# Patient Record
Sex: MALE | Race: WHITE | Employment: UNEMPLOYED | URBAN - METROPOLITAN AREA
[De-identification: names, ages, dates, MRNs, and addresses within clinical notes are randomized per-mention and may not be internally consistent; named-entity substitution may affect disease eponyms.]

---

## 2018-10-22 ENCOUNTER — OFFICE VISIT (OUTPATIENT)
Dept: FAMILY MEDICINE | Facility: CLINIC | Age: 48
End: 2018-10-22

## 2018-10-22 ENCOUNTER — TELEPHONE (OUTPATIENT)
Dept: ADDICTION MEDICINE | Facility: CLINIC | Age: 48
End: 2018-10-22

## 2018-10-22 VITALS
HEART RATE: 75 BPM | SYSTOLIC BLOOD PRESSURE: 110 MMHG | WEIGHT: 177 LBS | TEMPERATURE: 97.5 F | OXYGEN SATURATION: 98 % | DIASTOLIC BLOOD PRESSURE: 70 MMHG

## 2018-10-22 DIAGNOSIS — F19.20 ADDICTION TO DRUG (H): ICD-10-CM

## 2018-10-22 DIAGNOSIS — F10.29 ALCOHOL DEPENDENCE WITH UNSPECIFIED ALCOHOL-INDUCED DISORDER (H): Primary | ICD-10-CM

## 2018-10-22 PROCEDURE — 99213 OFFICE O/P EST LOW 20 MIN: CPT | Performed by: FAMILY MEDICINE

## 2018-10-22 RX ORDER — BUPRENORPHINE HYDROCHLORIDE AND NALOXONE HYDROCHLORIDE DIHYDRATE 8; 2 MG/1; MG/1
1 TABLET SUBLINGUAL 2 TIMES DAILY
COMMUNITY

## 2018-10-22 RX ORDER — ESCITALOPRAM OXALATE 20 MG/1
20 TABLET ORAL DAILY
COMMUNITY

## 2018-10-22 RX ORDER — METHYLPHENIDATE HYDROCHLORIDE 36 MG/1
36 TABLET ORAL EVERY MORNING
COMMUNITY

## 2018-10-22 NOTE — MR AVS SNAPSHOT
After Visit Summary   10/22/2018    Chong Ribeiro    MRN: [id number]           Patient Information     Date Of Birth          1970        Visit Information        Provider Department      10/22/2018 12:20 PM Ilya Irene MD [facility]        Today's Diagnoses     Alcohol dependence with unspecified alcohol-induced disorder (H)    -  1    Addiction to drug (H)           Follow-ups after your visit        Additional Services     ADDICTION MEDICINE REFERRAL       The Addiction Medicine Service is prepared to provide consultation for and, if necessary, ongoing care for patients with the disease of addiction, ages 16 and up.     For acute detox, please send your patient to the ER or contact [facility] at [phone].     Common problems that may warrant referral to the Addiction Medicine Service are:  Alcohol use disorder - diagnosis, treatment referral, acute and protracted withdrawal management, and ongoing medication assisted treatment including Antabuse and Naltrexone.  Opioid Use Disorder - medication assisted treatment including Buprenorphine (Suboxone) or extended release Naltrexone (Vivitrol)  Benzodiazepine dependence - extended outpatient detoxification  Many other issues pertaining to addiction, relapse, and recovery    Please contact our scheduling staff at [phone] with any questions.    Referrals to the Addiction Medicine Service assume that the referring provider has discussed the referral with the patient.  Referral will be reviewed and if appropriate, the patient will be contacted to schedule appointment.  If not appropriate, the provider will be contacted with further information.    Please answer the following questions so we can better service your patient:    Drug of choice: currently not on drugs, on suboxone for opiates  Do they have a [facility] PCP:  No     Please bring the following to your appointment:  >>   List of current  "medications   >>   Any relevant history                  Who to contact     If you have questions or need follow up information about today's clinic visit or your schedule please contact [facility] directly at [phone].  Normal or non-critical lab and imaging results will be communicated to you by MyChart, letter or phone within 4 business days after the clinic has received the results. If you do not hear from us within 7 days, please contact the clinic through MyChart or phone. If you have a critical or abnormal lab result, we will notify you by phone as soon as possible.  Submit refill requests through [employer] or call your pharmacy and they will forward the refill request to us. Please allow 3 business days for your refill to be completed.          Additional Information About Your Visit        MyChart Information     [employer] lets you send messages to your doctor, view your test results, renew your prescriptions, schedule appointments and more. To sign up, go to [email or web address] . Click on \"Log in\" on the left side of the screen, which will take you to the Welcome page. Then click on \"Sign up Now\" on the right side of the page.     You will be asked to enter the access code listed below, as well as some personal information. Please follow the directions to create your username and password.     Your access code is: [email or web address]  Expires: 2019 12:25 PM     Your access code will  in 90 days. If you need help or a new code, please call your [place] clinic or [phone].        Care EveryWhere ID     This is your Care EveryWhere ID. This could be used by other organizations to access your [facility] medical records  [id number]        Your Vitals Were     Pulse Temperature Pulse Oximetry             75 97.5  F (36.4  C) (Tympanic) 98%          Blood Pressure from Last 3 Encounters:   10/22/18 110/70    Weight from Last 3 Encounters:   10/22/18 177 lb (80.3 kg)              We " Performed the Following     ADDICTION MEDICINE REFERRAL        Primary Care Provider    None Specified       No primary provider on file.        Equal Access to Services     MAREMICHAEL GEENA : Daiana Hurley, colt acosta, shanna bermeo, jackie abrams . So [facility] [phone].    ATENCIÓN: Si habla español, tiene a chaidez disposición servicios gratuitos de asistencia lingüística. Llame al [phone].    We comply with applicable federal civil rights laws and [place] laws. We do not discriminate on the basis of race, color, national origin, age, disability, sex, sexual orientation, or gender identity.            Thank you!     Thank you for choosing [facility]  for your care. Our goal is always to provide you with excellent care. Hearing back from our patients is one way we can continue to improve our services. Please take a few minutes to complete the written survey that you may receive in the mail after your visit with us. Thank you!             Your Updated Medication List - Protect others around you: Learn how to safely use, store and throw away your medicines at www.disposemymeds.org.          This list is accurate as of 10/22/18 12:25 PM.  Always use your most recent med list.                   Brand Name Dispense Instructions for use Diagnosis    buprenorphine-naloxone 8-2 MG Subl sublingual tablet    SUBOXONE     Place 1 tablet under the tongue 2 times daily        CONCERTA 36 MG CR tablet   Generic drug:  methylphenidate ER      Take 36 mg by mouth every morning        LEXAPRO 20 MG tablet   Generic drug:  escitalopram      Take 20 mg by mouth daily

## 2018-10-22 NOTE — PROGRESS NOTES
SUBJECTIVE:   Chong Ribeiro is a 48 year old male who presents to clinic today for the following health issues:      Addiction referral.     Patient is a 48-year-old male who is originally from [place] moved to [place] 8 days ago.  Previously according to him he has addiction to multiple medications including opioids, heroin.  He is currently on Suboxone as per patient.  Takes Lexapro and Concerta.  Patient also have addiction to alcohol which according to him he is trying to wean off.  He was drinking heavily up until last week and he is not drinking 1-2 drinks.  He denies any withdrawal.  He is wondering what he can do to get some more help regarding his addiction.        Problem list and histories reviewed & adjusted, as indicated.  Additional history: as documented    Patient Active Problem List   Diagnosis     Alcohol dependence with unspecified alcohol-induced disorder (H)     Addiction to drug (H)     No past surgical history on file.    Social History   Substance Use Topics     Smoking status: Current Every Day Smoker     Packs/day: 0.25     Smokeless tobacco: Never Used     Alcohol use Yes     No family history on file.      Current Outpatient Prescriptions   Medication Sig Dispense Refill     buprenorphine-naloxone (SUBOXONE) 8-2 MG SUBL sublingual tablet Place 1 tablet under the tongue 2 times daily       escitalopram (LEXAPRO) 20 MG tablet Take 20 mg by mouth daily       methylphenidate ER (CONCERTA) 36 MG CR tablet Take 36 mg by mouth every morning         Reviewed and updated as needed this visit by clinical staff  Tobacco  Allergies  Meds  Soc Hx      Reviewed and updated as needed this visit by Provider         ROS:  CONSTITUTIONAL: NEGATIVE for fever, chills, change in weight  ENT/MOUTH: NEGATIVE for ear, mouth and throat problems  RESP: NEGATIVE for significant cough or SOB  CV: NEGATIVE for chest pain, palpitations or peripheral edema    OBJECTIVE:                                                     /70  Pulse 75  Temp 97.5  F (36.4  C) (Tympanic)  Wt 177 lb (80.3 kg)  SpO2 98%  There is no height or weight on file to calculate BMI.   GENERAL: healthy, alert, well nourished, well hydrated, no distress  NECK: no tenderness, no adenopathy, no asymmetry, no masses, no stiffness; thyroid- normal to palpation  RESP: lungs clear to auscultation - no rales, no rhonchi, no wheezes  CV: regular rates and rhythm, normal S1 S2, no S3 or S4 and no murmur, no click or rub -         ASSESSMENT/PLAN:                                                        ICD-10-CM    1. Alcohol dependence with unspecified alcohol-induced disorder (H) F10.29 ADDICTION MEDICINE REFERRAL   2. Addiction to drug (H) F19.20 ADDICTION MEDICINE REFERRAL       Patient is advised to seek attention from addiction medicine for further evaluation and referral.  Patient is not currently in any withdrawal.  If he has any acute concern he should contact the emergency room for evaluation and further evaluation otherwise I would suggest him to follow-up with addiction medicine for proper evaluation and future plan.      Ilya Irene MD  [facility] [place]

## 2018-10-22 NOTE — TELEPHONE ENCOUNTER
Please schedule appointment for patient with Addiction Medicine provider for alcohol and opioid use (prescribed Suboxone in [place])

## 2018-10-22 NOTE — TELEPHONE ENCOUNTER
Please review referral.  Route back to reception pool # [id number].  Pt has no insurance.       Jose Melendez

## 2018-10-24 NOTE — TELEPHONE ENCOUNTER
Writer attempted to reach pt, Unable to LVM mailbox not set-up.  Two more attempts will be made.      Jose Melendez

## 2018-11-01 NOTE — TELEPHONE ENCOUNTER
2nd attempt to reach out to pt. Unavle to LVM due to mailbox not set-up. 1 more attempt will be made.    Genesis Caro

## 2018-11-05 NOTE — TELEPHONE ENCOUNTER
Third and final attempt to reach pt; no answer. Unable to leave a message mailbox not set-up. Writer is routing this encounter to referring provider. Please inform pt that we tried to reach him to get an appt scheduled with an addiction medicine provider. Please have pt call us back if he is still interested in being seen at [facility]. [phone]. Writer is closing this encounter, no further action needed.     Thank you,  Jose Melendez      [facility]